# Patient Record
Sex: FEMALE | Race: WHITE | NOT HISPANIC OR LATINO | URBAN - METROPOLITAN AREA
[De-identification: names, ages, dates, MRNs, and addresses within clinical notes are randomized per-mention and may not be internally consistent; named-entity substitution may affect disease eponyms.]

---

## 2021-04-08 DIAGNOSIS — Z23 ENCOUNTER FOR IMMUNIZATION: ICD-10-CM

## 2023-06-09 ENCOUNTER — CONSULT (OUTPATIENT)
Dept: GASTROENTEROLOGY | Facility: CLINIC | Age: 68
End: 2023-06-09
Payer: MEDICARE

## 2023-06-09 ENCOUNTER — TELEPHONE (OUTPATIENT)
Dept: GASTROENTEROLOGY | Facility: CLINIC | Age: 68
End: 2023-06-09

## 2023-06-09 VITALS
WEIGHT: 175 LBS | BODY MASS INDEX: 29.88 KG/M2 | HEIGHT: 64 IN | DIASTOLIC BLOOD PRESSURE: 82 MMHG | SYSTOLIC BLOOD PRESSURE: 130 MMHG

## 2023-06-09 DIAGNOSIS — Z80.0 FAMILY HISTORY OF COLON CANCER: ICD-10-CM

## 2023-06-09 DIAGNOSIS — Z86.010 HISTORY OF COLON POLYPS: ICD-10-CM

## 2023-06-09 DIAGNOSIS — Z79.01 CURRENT USE OF LONG TERM ANTICOAGULATION: ICD-10-CM

## 2023-06-09 DIAGNOSIS — A04.72 C. DIFFICILE DIARRHEA: Primary | ICD-10-CM

## 2023-06-09 PROCEDURE — 99203 OFFICE O/P NEW LOW 30 MIN: CPT | Performed by: NURSE PRACTITIONER

## 2023-06-09 RX ORDER — MAGNESIUM OXIDE 400 MG/1
TABLET ORAL
COMMUNITY
Start: 2023-05-05

## 2023-06-09 RX ORDER — LORAZEPAM 0.5 MG/1
TABLET ORAL
COMMUNITY
Start: 2023-06-07

## 2023-06-09 RX ORDER — OMEPRAZOLE 20 MG/1
CAPSULE, DELAYED RELEASE ORAL
COMMUNITY

## 2023-06-09 RX ORDER — VALSARTAN 320 MG/1
320 TABLET ORAL DAILY
COMMUNITY
Start: 2023-05-05

## 2023-06-09 RX ORDER — DILTIAZEM HYDROCHLORIDE 120 MG/1
CAPSULE, COATED, EXTENDED RELEASE ORAL
COMMUNITY

## 2023-06-09 RX ORDER — ACETAMINOPHEN 500 MG
500 TABLET ORAL
COMMUNITY

## 2023-06-09 RX ORDER — CARVEDILOL 12.5 MG/1
25 TABLET ORAL 2 TIMES DAILY
COMMUNITY

## 2023-06-09 RX ORDER — ATORVASTATIN CALCIUM 10 MG/1
TABLET, FILM COATED ORAL
COMMUNITY

## 2023-06-09 RX ORDER — APIXABAN 5 MG/1
5 TABLET, FILM COATED ORAL 2 TIMES DAILY
COMMUNITY
Start: 2023-05-11

## 2023-06-09 RX ORDER — VANCOMYCIN HYDROCHLORIDE 125 MG/1
CAPSULE ORAL
COMMUNITY
Start: 2023-05-31

## 2023-06-09 RX ORDER — POLYETHYLENE GLYCOL 3350, SODIUM SULFATE ANHYDROUS, SODIUM BICARBONATE, SODIUM CHLORIDE, POTASSIUM CHLORIDE 236; 22.74; 6.74; 5.86; 2.97 G/4L; G/4L; G/4L; G/4L; G/4L
4000 POWDER, FOR SOLUTION ORAL ONCE
Qty: 4000 ML | Refills: 0 | Status: SHIPPED | OUTPATIENT
Start: 2023-06-09 | End: 2023-06-09

## 2023-06-09 NOTE — TELEPHONE ENCOUNTER
Scheduled date of colonoscopy (as of today):8-21-23  Doctor performing colonoscopy:Janet  Location of colonoscopy:[facility]  Bowel prep reviewed with patient:Keegan  Instructions reviewed with patient by:SHIREEN  Clearances: Yes  Eliquis

## 2023-06-09 NOTE — PROGRESS NOTES
[facility] - Outpatient Consultation  Ly Kramer 75 y o  female MRN: [id number]  Encounter: [id number]    ASSESSMENT AND PLAN:      1  C  difficile diarrhea  Patient referred by PCP  She states she developed C  difficile after taking antibiotics for urinary tract infection  After 1 round of vancomycin she continued to have diarrhea and was given a second round of vancomycin 125 mg 4 times a day x10 days  Today she finishes her second round  She states her bowel movements are flat and in pieces     -Encouraged low fiber diet at this time   -Imodium, as needed as directed  -Adequate fluids  -Encourage patient to notify the office or via MyChart if diarrhea should recur in the next few days to a week  2   Current long-term use of anticoagulation meds  Patient is currently taking Eliquis 5 mg twice daily for history of atrial fibrillation  Patient denies chest pain, shortness of breath and or palpitations  Discussed with patient the small possibility of thromboembolic event while holding Eliquis 2 days  Requested for stoppage of medication will be forwarded to Dr Ari Austin  3  History of colon polyps/family history of colon cancer  Colonoscopy 2018  Patient had multiple polyps  Noted 7 to 10-year recall  Patient states both her parents  of colon cancer in their [de-identified]  In patient's current setting, will recall colonoscopy at 5 years  - Colonoscopy scheduled at [facility]  - polyethylene glycol (Golytely) 4000 mL solution; Take 4,000 mL by mouth once for 1 dose Take 4000 mL by mouth once for 1 dose   Use as directed  Dispense: 4000 mL; Refill: 0      Followup Appointment: 3 weeks after procedure  ______________________________________________________________________    Chief Complaint   Patient presents with   • C-Diff     Not getting worse but not getting better       HPI:   Ly Kramer is a 75y o  year old female with past medical history of atrial fibrillation on Eliquis, GERD, hypertension and anemia is referred by PCP for follow-up C  Difficile  Patient states she was diagnosed with C  difficile after taking antibiotics for a urinary tract infection  Patient states after 1 round of vancomycin she continued to have diarrhea and was placed on a second round of vancomycin which she will finish today  Patient states her bowel movements are now very small and comes out flat and sometimes in pieces however she has not had any liquid diarrhea or diarrhea for a few days  On exam, patient denies nausea, vomiting or abdominal pain  Denies hematochezia or melena  Discussed with patient low fiber diet  Also discussed with patient that she is taking magnesium for a slightly low magnesium level  Cutting dosing in half since she is taking the medication until its all  Can be contributing to loose bowel movements  Did have colonoscopy in 2018 and was told 7 to 10 years due to polyps  Patient states both her parents were diagnosed with colon cancer in their [de-identified]  Discussed with patient having her follow-up with her colonoscopy at this time        Historical Information   Past Medical History:   Diagnosis Date   • Anemia 1997   • GERD (gastroesophageal reflux disease) 2011   • Hypertension 1997     Past Surgical History:   Procedure Laterality Date   • COLONOSCOPY  2018   • UPPER GASTROINTESTINAL ENDOSCOPY  2011     Social History     Substance and Sexual Activity   Alcohol Use Never     Social History     Substance and Sexual Activity   Drug Use Never     Social History     Tobacco Use   Smoking Status Never   Smokeless Tobacco Never     Family History   Problem Relation Age of Onset   • Colon cancer Mother    • Hypertension Mother    • Colon cancer Father        Meds/Allergies     Current Outpatient Medications:   •  acetaminophen (TYLENOL) 500 mg tablet  •  atorvastatin (LIPITOR) 10 mg tablet  •  carvedilol (Coreg) 12 5 mg tablet  •  diltiazem (CARDIZEM CD) 120 "mg 24 hr capsule  •  Eliquis 5 MG  •  LORazepam (ATIVAN) 0 5 mg tablet  •  magnesium oxide (MAG-OX) 400 mg tablet  •  omeprazole (PriLOSEC) 20 mg delayed release capsule  •  polyethylene glycol (Golytely) 4000 mL solution  •  valsartan (DIOVAN) 320 MG tablet  •  vancomycin (VANCOCIN) 125 MG capsule    Allergies   Allergen Reactions   • Augmentin [Amoxicillin-Pot Clavulanate] Diarrhea       PHYSICAL EXAM:    Blood pressure 130/82, height 5' 4\" (1 626 m), weight 79 4 kg (175 lb)  Body mass index is 30 04 kg/m²  Normal exam    General Appearance: NAD, cooperative, alert  Eyes: Anicteric, PERRLA, EOMI  ENT:  Normocephalic, atraumatic, normal mucosa  Neck:  Supple, symmetrical, trachea midline,   Resp:  Clear to auscultation bilaterally; no rales, rhonchi or wheezing; respirations unlabored   CV:  S1 S2, Regular rate and rhythm; no murmur, rub, or gallop  GI:  Soft, non-tender, non-distended; normal bowel sounds; no masses, no organomegaly   Rectal: Deferred  Musculoskeletal: No cyanosis, clubbing or edema  Normal ROM  Skin:  No jaundice, rashes, or lesions   Heme/Lymph: No palpable cervical lymphadenopathy  Psych: Normal affect, good eye contact  Neuro: No gross deficits, AAOx3    Lab Results:   Lab Results   Component Value Date    HCT 32 5 (L) 01/04/2015    HGB 10 8 (L) 01/04/2015    MCV 94 01/04/2015     01/04/2015    WBC 8 89 01/04/2015     Lab Results   Component Value Date    ALKPHOS 111 01/04/2015    ALT 52 01/04/2015    ANIONGAP 7 01/05/2015    AST 22 01/04/2015    BILITOT 0 6 01/04/2015    BUN 36 (H) 01/05/2015    CALCIUM 9 9 01/05/2015     01/05/2015    CO2 26 01/05/2015    CREATININE 1 34 (H) 01/05/2015    GLUCOSE 119 01/05/2015    K 4 0 01/05/2015     01/05/2015    PROT 7 2 01/04/2015     No results found for: \"FERRITIN\", \"IRON\", \"TIBC\"  No results found for: \"LIPASE\"    Radiology Results:   No results found        REVIEW OF SYSTEMS:    CONSTITUTIONAL: Denies any fever, chills, " rigors, and weight loss  HEENT: No earache or tinnitus  Denies hearing loss or visual disturbances  CARDIOVASCULAR: No chest pain or palpitations  RESPIRATORY: Denies any cough, hemoptysis, shortness of breath or dyspnea on exertion  GASTROINTESTINAL: As noted in the History of Present Illness  GENITOURINARY: No problems with urination  Denies any hematuria or dysuria  NEUROLOGIC: No dizziness or vertigo, denies headaches  MUSCULOSKELETAL: Denies any muscle or joint pain  SKIN: Denies skin rashes or itching  ENDOCRINE: Denies excessive thirst  Denies intolerance to heat or cold  PSYCHOSOCIAL: Denies depression or anxiety  Denies any recent memory loss

## 2023-09-29 ENCOUNTER — TELEPHONE (OUTPATIENT)
Dept: GASTROENTEROLOGY | Facility: CLINIC | Age: 68
End: 2023-09-29

## 2023-09-29 ENCOUNTER — TELEPHONE (OUTPATIENT)
Age: 68
End: 2023-09-29

## 2023-09-29 NOTE — TELEPHONE ENCOUNTER
Left message for patient to call back an update [facility] questions for 10/19/2023 colonoscopy.
[facility]

## 2023-10-09 ENCOUNTER — TELEPHONE (OUTPATIENT)
Dept: GASTROENTEROLOGY | Facility: CLINIC | Age: 68
End: 2023-10-09

## 2024-01-08 ENCOUNTER — TELEPHONE (OUTPATIENT)
Dept: GASTROENTEROLOGY | Facility: CLINIC | Age: 69
End: 2024-01-08

## 2024-01-16 ENCOUNTER — TELEPHONE (OUTPATIENT)
Dept: GASTROENTEROLOGY | Facility: CLINIC | Age: 69
End: 2024-01-16

## 2024-01-16 NOTE — TELEPHONE ENCOUNTER
Our mutual patient is scheduled for a procedure: 1/31/2024    On colonoscopy    With: Alen    He/She is taking the following blood thinner: Eliquis    Can this be stopped 2 Days prior to the procedure.    Physician approving Clearance: [facility]    Last dose will be:  1/28/2024

## 2024-01-17 ENCOUNTER — ANESTHESIA (OUTPATIENT)
Dept: ANESTHESIOLOGY | Facility: AMBULATORY SURGERY CENTER | Age: 69
End: 2024-01-17

## 2024-01-17 ENCOUNTER — ANESTHESIA EVENT (OUTPATIENT)
Dept: ANESTHESIOLOGY | Facility: AMBULATORY SURGERY CENTER | Age: 69
End: 2024-01-17

## 2024-02-23 ENCOUNTER — TELEPHONE (OUTPATIENT)
Dept: NEUROLOGY | Facility: CLINIC | Age: 69
End: 2024-02-23

## 2024-02-23 NOTE — TELEPHONE ENCOUNTER
Made an appt reminder call no answer lmom. Appt with dr frances on 2/28/24 @8:00am in [facility].

## 2024-03-07 ENCOUNTER — ANESTHESIA EVENT (OUTPATIENT)
Dept: ANESTHESIOLOGY | Facility: AMBULATORY SURGERY CENTER | Age: 69
End: 2024-03-07

## 2024-03-07 ENCOUNTER — ANESTHESIA (OUTPATIENT)
Dept: ANESTHESIOLOGY | Facility: AMBULATORY SURGERY CENTER | Age: 69
End: 2024-03-07

## 2024-05-16 ENCOUNTER — TELEPHONE (OUTPATIENT)
Dept: NEUROLOGY | Facility: CLINIC | Age: 69
End: 2024-05-16

## 2024-07-31 ENCOUNTER — OFFICE VISIT (OUTPATIENT)
Dept: NEUROLOGY | Facility: CLINIC | Age: 69
End: 2024-07-31
Payer: MEDICARE

## 2024-07-31 ENCOUNTER — TELEPHONE (OUTPATIENT)
Dept: NEUROLOGY | Facility: CLINIC | Age: 69
End: 2024-07-31

## 2024-07-31 VITALS
DIASTOLIC BLOOD PRESSURE: 84 MMHG | HEIGHT: 64 IN | TEMPERATURE: 97.9 F | BODY MASS INDEX: 29.37 KG/M2 | HEART RATE: 76 BPM | WEIGHT: 172 LBS | SYSTOLIC BLOOD PRESSURE: 132 MMHG

## 2024-07-31 DIAGNOSIS — G60.3 IDIOPATHIC PROGRESSIVE NEUROPATHY: ICD-10-CM

## 2024-07-31 DIAGNOSIS — G62.9 POLYNEUROPATHY: Primary | ICD-10-CM

## 2024-07-31 DIAGNOSIS — G60.9 HEREDITARY AND IDIOPATHIC NEUROPATHY, UNSPECIFIED: ICD-10-CM

## 2024-07-31 PROCEDURE — 99204 OFFICE O/P NEW MOD 45 MIN: CPT | Performed by: PSYCHIATRY & NEUROLOGY

## 2024-07-31 RX ORDER — GABAPENTIN 100 MG/1
100 CAPSULE ORAL
COMMUNITY
Start: 2024-07-02

## 2024-07-31 NOTE — TELEPHONE ENCOUNTER
Called pt's PCP office - [facility] [phone]. Unable to speak w/live agent. Left detailed msg re: note below.   Requested return call acknowledging receipt of vm.

## 2024-07-31 NOTE — ASSESSMENT & PLAN NOTE
Patient here today for initial consultation for lower extremity paresthesias.  Patient was seen in the emergency room x 3 at [facility].  No reports sent for review.  No access at this juncture for reports.  Making a request at this time.  Patient also had MRI of the cervical spine as well as EMG of the lower extremities done over the past month.  These were ordered by her orthopedist that she follows for her neck.  Reports unavailable.  However patient was able to get into her portal and I reviewed on her iPad.  Patient had EMG done on June 24, 2024.  Only interpretation was available.  No raw data or overall graphics were on report.  EMG assessment was idiopathic polyneuropathy in body of report, there is mention of absent bilateral sural and decreased amplitude and conduction velocity of fibular motor and tibial motor conductions.  Patient also had an MRI of the cervical spine performed on June 27, 2024.  Again this was reviewed on iPad.  Patient with multilevel degenerative disc disease most notable at C4-C5, less notable C5-C6 and C6-C7.  Patient also has a right thyroid nodule.  Patient was unaware of the thyroid nodule.  Per report it also recommended an ultrasound follow-up.  Reminded patient that it is important for her to follow-up with her orthopedist to order the study as well as her PCP to ensure that she gets the proper follow-up for this abnormality.  Patient will call both of these providers today.  On exam, patient with decreased vibratory sensation bilateral lower extremities.  Patient also with decreased proprioception bilateral lower extremities.  Patient with decreased temperature in the lower half of the calves bilaterally.  Patient's symptoms, EMG, and exam fit most with a peripheral neuropathy.  There are no myelopathic findings on examination today.  MRI cervical spine also reveals normal cord in the cervical spine.  Examination is otherwise unremarkable.  Reviewed with patient the  typical pathophysiology of neuropathy.  Reviewed clinical presentation.  Patient is already on gabapentin 100 mg at bedtime.  Patient had some blood work done on April 6 with normal B12 at 790, normal folate.  Patient also recommended to follow-up with PCP due to slightly low GFR of 58 on those labs.  Additional neuropathy labs were ordered for today.  Patient may need an updated EMG of the lower extremities in the future.  Patient has no symptoms in the upper extremities.  No history of diabetes or history of alcohol.  Patient to follow-up after laboratory studies.  Of note, patient is on Eliquis for history of A-fib.

## 2024-07-31 NOTE — TELEPHONE ENCOUNTER
Please call pcp office to alert them to my note from today. Please tell them to specifically let pcp know that pt had an mri c spine done by ortho with evidence of right sided thyroid nodule that warrrants additional testing ie ultrasound. Pt was unaware at her new pt appt today and this needs to be further addressed.  I could only view report from pt's ipad.  She was self referral without any prior workup sent to us.

## 2024-07-31 NOTE — PROGRESS NOTES
Patient ID: Raina Velez is a 68 y.o. female.    Assessment/Plan:    Polyneuropathy  Patient here today for initial consultation for lower extremity paresthesias.  Patient was seen in the emergency room x 3 at [facility].  No reports sent for review.  No access at this juncture for reports.  Making a request at this time.  Patient also had MRI of the cervical spine as well as EMG of the lower extremities done over the past month.  These were ordered by her orthopedist that she follows for her neck.  Reports unavailable.  However patient was able to get into her portal and I reviewed on her iPad.  Patient had EMG done on June 24, 2024.  Only interpretation was available.  No raw data or overall graphics were on report.  EMG assessment was idiopathic polyneuropathy in body of report, there is mention of absent bilateral sural and decreased amplitude and conduction velocity of fibular motor and tibial motor conductions.  Patient also had an MRI of the cervical spine performed on June 27, 2024.  Again this was reviewed on iPad.  Patient with multilevel degenerative disc disease most notable at C4-C5, less notable C5-C6 and C6-C7.  Patient also has a right thyroid nodule.  Patient was unaware of the thyroid nodule.  Per report it also recommended an ultrasound follow-up.  Reminded patient that it is important for her to follow-up with her orthopedist to order the study as well as her PCP to ensure that she gets the proper follow-up for this abnormality.  Patient will call both of these providers today.  On exam, patient with decreased vibratory sensation bilateral lower extremities.  Patient also with decreased proprioception bilateral lower extremities.  Patient with decreased temperature in the lower half of the calves bilaterally.  Patient's symptoms, EMG, and exam fit most with a peripheral neuropathy.  There are no myelopathic findings on examination today.  MRI cervical spine also reveals normal cord in  the cervical spine.  Examination is otherwise unremarkable.  Reviewed with patient the typical pathophysiology of neuropathy.  Reviewed clinical presentation.  Patient is already on gabapentin 100 mg at bedtime.  Patient had some blood work done on April 6 with normal B12 at 790, normal folate.  Patient also recommended to follow-up with PCP due to slightly low GFR of 58 on those labs.  Additional neuropathy labs were ordered for today.  Patient may need an updated EMG of the lower extremities in the future.  Patient has no symptoms in the upper extremities.  No history of diabetes or history of alcohol.  Patient to follow-up after laboratory studies.  Of note, patient is on Eliquis for history of A-fib.       Diagnoses and all orders for this visit:    Polyneuropathy  -     Vitamin B6; Future  -     Vitamin B1 (Thiamine), Serum/Plasma, LC/MS/MS; Future  -     Folate; Future  -     Lyme Total AB W Reflex to IGM/IGG; Future  -     Sjogren's Antibodies; Future  -     Protein electrophoresis, serum; Future  -     Vitamin B6  -     Folate  -     Lyme Total AB W Reflex to IGM/IGG  -     Sjogren's Antibodies  -     Protein electrophoresis, serum    Hereditary and idiopathic neuropathy, unspecified  -     Vitamin B6; Future  -     Vitamin B6    Idiopathic progressive neuropathy  -     Folate; Future  -     Folate    Other orders  -     gabapentin (NEURONTIN) 100 mg capsule; Take 100 mg by mouth daily at bedtime           Subjective:    HPI    Patient here today for initial neuro consultation.  Patient is a 68-year-old female with history of anemia GERD hypertension and hypercholesterolemia and A-fib on Eliquis.  Patient presents today for evaluation of bilateral paresthesias of the lower extremities.  Patient has noted symptoms since November 2023.  [facility] and discharged.  Unfortunately I do not have copy of any of the ER evaluations.  Patient has followed up  with her PCP.  Patient is also been seen by her orthopedist.  Patient has seen the orthopedist due to history of neck pain.  Patient is following with Dr. Maria De Jesus Colby.  Patient is currently a self-referral.  No prior information has been sent for our review.  Staff is trying to obtain as we are at the appointment today.  All history provided is per patient.  Patient reports that her orthopedist ordered MRI of the cervical spine.  This was done on June 27, 2024.  The report was reviewed via patient's iPad in the office today through her portal.  Again specifically asking for any studies or consultation notes to be sent to our office from the PCP and orthopedist.  Per the MRI, patient with multilevel degenerative disc disease at C4-C5.  Last degree at C5-C6 and C6-C7.  Patient also with a right thyroid nodule that was also seen.  Per the report, patient is to follow-up with an ultrasound.  Patient was unaware of this finding.  Patient did bring it to the attention of the ordering physician.  Patient is unclear of further follow-up.  Patient strongly recommended at visit today to touch base with ordering provider as well as her PCP for follow-up imaging based on this abnormal finding.  Patient also had an EMG done of the lower extremities.  Again only the report was available via patient's iPad.  No role data or graphics from the study was seen.  Per assessment, patient with idiopathic peripheral neuropathy.  Per notes technically limited due to lower extremity edema.  Per body of the report, there does appear to be absent sural potentials, and abnormal amplitudes and conduction velocity on fibular motor and tibial motor studies.  Again specific numbers and waveforms not available for review.  Again asked staff to try to obtain full data.  Patient denies any loss of control of bowel or bladder.  No change in vision.  No change in speech or swallowing.  No symptoms in the upper extremities.  Patient had some blood work  "performed on April 6, 2024.  Vitamin B12 790, folate greater than 20, GFR slightly low at 58,.  Patient likely by examination and EMG with evidence of an underlying polyneuropathy.  Reviewed in depth with patient the pathophysiology of polyneuropathy.  Also recommended looking for any medical causes of condition.  Additional laboratory blood work was also ordered for the patient.  Patient is already on gabapentin for her symptoms.  Again reminded patient that this was a med just for symptoms.  Patient denies any weakness of the feet or legs.  No dropped foot bilaterally.  No intractable pain or nocturnal symptoms.  Currently no acute radicular symptoms in either the upper or lower extremities.  No acute myelopathic findings on exam.  Patient to continue with low-dose gabapentin as directed by her PCP.  Patient is already followed for renal status.    The following portions of the patient's history were reviewed and updated as appropriate: allergies, current medications, past family history, past medical history, past social history, past surgical history, and problem list and med rec and ros rev.         Objective:    Blood pressure 132/84, pulse 76, temperature 97.9 °F (36.6 °C), height 5' 4\" (1.626 m), weight 78 kg (172 lb).    Physical Exam  Constitutional:       General: She is not in acute distress.     Appearance: She is not ill-appearing.   Eyes:      General: Lids are normal.      Extraocular Movements: EOM normal     Pupils: Pupils are equal, round, and reactive to light.   Musculoskeletal:      Right lower leg: No edema.   Neurological:      Mental Status: She is alert.      Motor: Motor strength is normal.     Deep Tendon Reflexes:      Reflex Scores:       Tricep reflexes are 2+ on the right side and 2+ on the left side.       Bicep reflexes are 2+ on the right side and 2+ on the left side.       Brachioradialis reflexes are 2+ on the right side and 2+ on the left side.       Patellar reflexes are 1+ on " the right side and 1+ on the left side.       Achilles reflexes are 1+ on the right side and 1+ on the left side.  Psychiatric:         Speech: Speech normal.         Neurological Exam  Mental Status  Alert. Speech is normal. Language is fluent with no aphasia. Attention and concentration are normal. Fund of knowledge is appropriate for level of education.    Cranial Nerves  CN II: Visual acuity is normal. Visual fields full to confrontation.  CN III, IV, VI: Abnormal extraocular movements: Bilateral esotropia right worse than left, alternating. Normal lids and orbits bilaterally. Pupils equal round and reactive to light bilaterally.  CN V: Facial sensation is normal.  CN VII: Full and symmetric facial movement.  CN VIII: Hearing is normal.  CN IX, X: Palate elevates symmetrically. Normal gag reflex.  CN XI: Shoulder shrug strength is normal.  CN XII: Tongue midline without atrophy or fasciculations.    Motor  Normal muscle bulk throughout. Normal muscle tone. No abnormal involuntary movements. Strength is 5/5 throughout all four extremities.    Sensory  Dec vib juan lowers  Dec temp lower half calves juan  Dec prop juan lowers.    Reflexes                                            Right                      Left  Brachioradialis                    2+                         2+  Biceps                                 2+                         2+  Triceps                                2+                         2+  Finger flex                           2+                         2+  Hamstring                            2+                         2+  Patellar                                1+                         1+  Achilles                                1+                         1+  Right Plantar: downgoing  Left Plantar: downgoing    Coordination  Right: Finger-to-nose normal. Rapid alternating movement normal.Left: Finger-to-nose normal. Rapid alternating movement normal.    Gait  Casual gait is normal  including stance, stride, and arm swing.        ROS:    Review of Systems   Constitutional:  Positive for fatigue. Negative for appetite change and fever.   HENT: Negative.  Negative for hearing loss, tinnitus, trouble swallowing and voice change.    Eyes: Negative.  Negative for photophobia, pain and visual disturbance.   Respiratory: Negative.  Negative for shortness of breath.    Cardiovascular: Negative.  Negative for palpitations.   Gastrointestinal: Negative.  Negative for nausea and vomiting.   Endocrine: Negative.  Negative for cold intolerance.   Genitourinary: Negative.  Negative for dysuria, frequency and urgency.   Musculoskeletal:  Positive for gait problem. Negative for back pain, myalgias, neck pain and neck stiffness.        May 15, [facility], Legs & weakness did tests and sent her home. Aches in her legs, burning in both feet. Happens mostly in the morning, no sleep issues   Skin: Negative.  Negative for rash.   Allergic/Immunologic: Negative.    Neurological:  Positive for weakness and numbness. Negative for dizziness, tremors, seizures, syncope, facial asymmetry, speech difficulty, light-headedness and headaches.        Numb/Ting in legs for 6-8 mos   Hematological:  Bruises/bleeds easily.   Psychiatric/Behavioral: Negative.  Negative for confusion, hallucinations and sleep disturbance.    All other systems reviewed and are negative.

## 2024-08-04 LAB
ALBUMIN SERPL ELPH-MCNC: 3.8 G/DL (ref 2.9–4.4)
ALBUMIN/GLOB SERPL: 1.3 {RATIO} (ref 0.7–1.7)
ALPHA1 GLOB SERPL ELPH-MCNC: 0.2 G/DL (ref 0–0.4)
ALPHA2 GLOB SERPL ELPH-MCNC: 0.8 G/DL (ref 0.4–1)
B-GLOBULIN SERPL ELPH-MCNC: 1 G/DL (ref 0.7–1.3)
ENA SS-A AB SER-ACNC: <0.2 AI (ref 0–0.9)
ENA SS-B AB SER-ACNC: <0.2 AI (ref 0–0.9)
FOLATE SERPL-MCNC: >20 NG/ML
GAMMA GLOB SERPL ELPH-MCNC: 0.9 G/DL (ref 0.4–1.8)
GLOBULIN SER CALC-MCNC: 2.9 G/DL (ref 2.2–3.9)
LABORATORY COMMENT REPORT: NORMAL
M PROTEIN SERPL ELPH-MCNC: NORMAL G/DL
PROT SERPL-MCNC: 6.7 G/DL (ref 6–8.5)
VIT B6 SERPL-MCNC: 43.4 UG/L (ref 3.4–65.2)

## 2024-08-06 NOTE — TELEPHONE ENCOUNTER
Called pt's PCP office - [facility] [phone]. Unable to speak w/live agent. Left detailed msg re: note below (ext. 107).     Requested return call acknowledging receipt of vm.

## 2024-08-08 NOTE — TELEPHONE ENCOUNTER
Called pt's PCP office - [facility] [phone]. Spoke w/Arely. PCP office did speak w/pt and a thyroid US was ordered on 7/31/24 after receiving our vm.

## 2024-11-07 ENCOUNTER — TELEPHONE (OUTPATIENT)
Dept: NEUROLOGY | Facility: CLINIC | Age: 69
End: 2024-11-07

## 2024-11-07 ENCOUNTER — PATIENT MESSAGE (OUTPATIENT)
Dept: NEUROLOGY | Facility: CLINIC | Age: 69
End: 2024-11-07

## 2024-11-07 NOTE — PATIENT COMMUNICATION
LOV 7/31/24 Polyneuropathy    --Pt on Gabapentin 100 mg HS (prescribed by her PCP).  --Pt had EMG LE and MRI C-Spine on 6/24/24    NOV 1/30/25    Dr. Engel: please advise

## 2024-11-07 NOTE — TELEPHONE ENCOUNTER
Pt seen once.  Looking for full emg report from [place].  Already reviewed impression.  Carmen not needed again prior to next visit. Please obtain full report.  As discussed at appt, need to make sure no underlying renal issues before further increase in gabapentin.  Told pt to follow up with pcp.  Pt to check with pcp if ok to go to 200mg hs.  Carmen would be helpful for sxs at night.

## 2024-11-18 ENCOUNTER — TELEPHONE (OUTPATIENT)
Dept: NEUROLOGY | Facility: CLINIC | Age: 69
End: 2024-11-18

## 2024-11-18 NOTE — TELEPHONE ENCOUNTER
"Let pt know we received an incomplete report from emg.  On impression it says \"see office notes\".  For recommendations says \"see office notes\".  Need notes from Dr Maria De Jesus Kumar.  Looks like done at [facility] orthopedics at [facility].   "

## 2025-01-13 ENCOUNTER — TELEPHONE (OUTPATIENT)
Dept: NEUROLOGY | Facility: CLINIC | Age: 70
End: 2025-01-13

## 2025-01-13 NOTE — TELEPHONE ENCOUNTER
DOMINIC to confirm your upcoming appt, 1/30/25 @9:30am (9:15am) at the [facility] office, to confirm/RS if you are unable to keep this appt please call [phone]

## 2025-03-20 ENCOUNTER — OFFICE VISIT (OUTPATIENT)
Dept: NEUROLOGY | Facility: CLINIC | Age: 70
End: 2025-03-20
Payer: MEDICARE

## 2025-03-20 VITALS
SYSTOLIC BLOOD PRESSURE: 140 MMHG | TEMPERATURE: 97.7 F | HEIGHT: 64 IN | BODY MASS INDEX: 28 KG/M2 | WEIGHT: 164 LBS | DIASTOLIC BLOOD PRESSURE: 80 MMHG | HEART RATE: 105 BPM

## 2025-03-20 DIAGNOSIS — G62.9 POLYNEUROPATHY: Primary | ICD-10-CM

## 2025-03-20 DIAGNOSIS — G60.3 IDIOPATHIC PROGRESSIVE NEUROPATHY: ICD-10-CM

## 2025-03-20 PROCEDURE — 99214 OFFICE O/P EST MOD 30 MIN: CPT | Performed by: PSYCHIATRY & NEUROLOGY

## 2025-03-20 RX ORDER — DILTIAZEM HYDROCHLORIDE 360 MG/1
1 CAPSULE, EXTENDED RELEASE ORAL DAILY
COMMUNITY
Start: 2025-01-29

## 2025-03-20 RX ORDER — METOPROLOL SUCCINATE 100 MG/1
1 TABLET, EXTENDED RELEASE ORAL 2 TIMES DAILY
COMMUNITY
Start: 2025-01-29

## 2025-03-20 NOTE — ASSESSMENT & PLAN NOTE
Patient here today for neuro follow-up  Patient initially seen for neuropathy in July.  Was able to obtain some raw data from EMG from [facility], patient with abnormalities in the bilateral fibular motor nerve potentials decreased amplitude decreased conduction, also bilateral tibial motor potentials decreased amplitude decreased conduction velocity, also no response seen from sensory bilateral sural responses.  Clinical and electrophysiological findings consistent with polyneuropathy.  July 31, 2024 labs, SPEP negative B6 43, Sjogren's negative  Vitamin B1 and cryoglobulins not performed despite being ordered at same time as other labs.  Will update these labs and again repeat vitamin B12.  Last vitamin B12 was done in April 2024.  Patient may also benefit from checking a hemoglobin A1c.  Patient reminded that she does have a lower GFR from February labs at 44 compared to November 2024 with a GFR of 53.  Gabapentin dosing per PCP to follow renal statusOverall exam stable.  Normal foot intrinsics.  Reviewed fall safety precautions especially since patient is on Eliquis for her known A-fib.  Patient to get her Lyme, vitamin B1, cryoglobulins performed as written.  Patient to get hemoglobin A1c ordered as part of her medical eval from PCP.  Patient following up with cardiology since she had [place] admission for CHF in end of January.  Overall patient stable.  Patient to follow-up with podiatry  Patient to call for any new symptoms.  Return in 6 months.

## 2025-03-20 NOTE — PROGRESS NOTES
"Name: Raina Velez      : 1955      MRN: [id number]  Encounter Provider: Cherry Engel MD  Encounter Date: 3/20/2025   Encounter department: [facility] [place]  :  Assessment & Plan  Polyneuropathy  Patient here today for neuro follow-up  Patient initially seen for neuropathy in July.  Was able to obtain some raw data from EMG from [facility], patient with abnormalities in the bilateral fibular motor nerve potentials decreased amplitude decreased conduction, also bilateral tibial motor potentials decreased amplitude decreased conduction velocity, also no response seen from sensory bilateral sural responses.  Clinical and electrophysiological findings consistent with polyneuropathy.  2024 labs, SPEP negative B6 43, Sjogren's negative  Vitamin B1 and cryoglobulins not performed despite being ordered at same time as other labs.  Will update these labs and again repeat vitamin B12.  Last vitamin B12 was done in 2024.  Patient may also benefit from checking a hemoglobin A1c.  Patient reminded that she does have a lower GFR from February labs at 44 compared to 2024 with a GFR of 53.  Gabapentin dosing per PCP to follow renal statusOverall exam stable.  Normal foot intrinsics.  Reviewed fall safety precautions especially since patient is on Eliquis for her known A-fib.  Patient to get her Lyme, vitamin B1, cryoglobulins performed as written.  Patient to get hemoglobin A1c ordered as part of her medical eval from PCP.  Patient following up with cardiology since she had [place] admission for CHF in end of January.  Overall patient stable.  Patient to follow-up with podiatry  Patient to call for any new symptoms.  Return in 6 months.               History of Present Illness   HPI   Patient here today for follow up neuro consultation. Pt initially seen on 24.  Per my last note \" Patient is a 68-year-old female with history of anemia GERD hypertension and " hypercholesterolemia and A-fib on Eliquis.  Patient presents today for evaluation of bilateral paresthesias of the lower extremities.  Patient has noted symptoms since November 2023.  Patient has been seen in the emergency room at [place] for 3 visits and discharged.  Unfortunately I do not have copy of any of the ER evaluations.  Patient has followed up with her PCP.  Patient is also been seen by her orthopedist.  Patient has seen the orthopedist due to history of neck pain.  Patient is following with Dr. Maria De Jesus Colby.  Patient is currently a self-referral.  No prior information has been sent for our review.  Staff is trying to obtain as we are at the appointment today.  All history provided is per patient.  Patient reports that her orthopedist ordered MRI of the cervical spine.  This was done on June 27, 2024.  The report was reviewed via patient's iPad in the office today through her portal.  Again specifically asking for any studies or consultation notes to be sent to our office from the PCP and orthopedist.  Per the MRI, patient with multilevel degenerative disc disease at C4-C5.  Last degree at C5-C6 and C6-C7.  Patient also with a right thyroid nodule that was also seen.  Per the report, patient is to follow-up with an ultrasound.  Patient was unaware of this finding.  Patient did bring it to the attention of the ordering physician.  Patient is unclear of further follow-up.  Patient strongly recommended at visit today to touch base with ordering provider as well as her PCP for follow-up imaging based on this abnormal finding.  Patient also had an EMG done of the lower extremities.  Again only the report was available via patient's iPad.  No role data or graphics from the study was seen.  Per assessment, patient with idiopathic peripheral neuropathy.  Per notes technically limited due to lower extremity edema.  Per body of the report, there does appear to be absent sural potentials, and abnormal amplitudes and  "conduction velocity on fibular motor and tibial motor studies.  Again specific numbers and waveforms not available for review.  Again asked staff to try to obtain full data.  Patient denies any loss of control of bowel or bladder.  No change in vision.  No change in speech or swallowing.  No symptoms in the upper extremities.  Patient had some blood work performed on April 6, 2024.  Vitamin B12 790, folate greater than 20, GFR slightly low at 58,.  Patient likely by examination and EMG with evidence of an underlying polyneuropathy.  Reviewed in depth with patient the pathophysiology of polyneuropathy.  Also recommended looking for any medical causes of condition.  Additional laboratory blood work was also ordered for the patient.  Patient is already on gabapentin for her symptoms.  Again reminded patient that this was a med just for symptoms.  Patient denies any weakness of the feet or legs.  No dropped foot bilaterally.  No intractable pain or nocturnal symptoms.  Currently no acute radicular symptoms in either the upper or lower extremities.  No acute myelopathic findings on exam.  Patient to continue with low-dose gabapentin as directed by her PCP.  Patient is already followed for renal status.\"  Patient here today for neuro follow-up.  Patient last seen as initial visit July 31, 2024.  Patient followed for her polyneuropathy.Of note, patient on Eliquis for atrial fibrillation.Some raw data from EMG from [facility] from June 2020 for bilateral fibular motor potentials decreased amplitude and decreased conduction velocity, bilateral tibial motor decreased amplitude and decreased conduction velocity, bilateral sural sensory potentials no response.Patient's clinical and electrophysiological findings most consistent with a polyneuropathy.  Patient had some updated labs since her last visit.  Patient had labs drawn on 7/31/2024 SPEP no M spike, vitamin B6 43, Sjogren's negative, folate greater than 20, Lyme B1 and " cryoglobulins were not performed by laboratory.  Patient's last vitamin B12 was done in April 2024 with a level of 790 at that time.  Of note, patient had some additional labs performed for other provider February 20, 2025 GFR low at 44 compared to value in November 2024 with a GFR of 53.  Patient is prescribed her gabapentin by her PCP who can also follow her renal clearance.  Patient notes no falls or trips since last visit.  No change in bowel or bladder.  No change in speech or swallowing.  No involvement of the upper extremities.  No change in vision.  Patient did have blood work performed as noted above.  Strongly recommend updated hemoglobin A1c.  Patient will discuss with PCP as well.  Patient following up with cardiology since her hospitalization for congestive heart failure back in the end of January.  Patient has upcoming cardiology appointment as well.  Patient follows with podiatry.  Patient reminded of caution on heights, ladders or inclines.  Only using cane since she came out of the hospital for her congestive heart failure as needed especially with increased activities or endurance.  The following portions of the patient's history were reviewed and updated as appropriate: allergies, current medications, past family history, past medical history, past social history, past surgical history, and problem list and med rec and ros rev.   Total time spent today 35 minutes. Greater than 50% of total time was spent with the patient and / or family counseling and / or coordination of care     Review of Systems   Constitutional:  Negative for appetite change, fatigue and fever.   HENT: Negative.  Negative for hearing loss, tinnitus, trouble swallowing and voice change.    Eyes: Negative.  Negative for photophobia, pain and visual disturbance.   Respiratory: Negative.  Negative for shortness of breath.    Cardiovascular: Negative.  Negative for palpitations.        Patient hospitalized for CHF Jan 27-29 [facility]  "  Gastrointestinal: Negative.  Negative for nausea and vomiting.   Endocrine: Negative.  Negative for cold intolerance.   Genitourinary: Negative.  Negative for dysuria, frequency and urgency.   Musculoskeletal:  Negative for back pain, gait problem, myalgias, neck pain and neck stiffness.   Skin: Negative.  Negative for rash.   Allergic/Immunologic: Negative.    Neurological:  Negative for dizziness, tremors, seizures, syncope, facial asymmetry, speech difficulty, weakness, light-headedness, numbness and headaches.   Hematological: Negative.  Does not bruise/bleed easily.   Psychiatric/Behavioral: Negative.  Negative for confusion, hallucinations and sleep disturbance.    All other systems reviewed and are negative.   I have personally reviewed the MA's review of systems and made changes as necessary.  Patient says Neuropathy is status quo       Objective   /80   Pulse 105   Temp 97.7 °F (36.5 °C)   Ht 5' 4\" (1.626 m)   Wt 74.4 kg (164 lb)   BMI 28.15 kg/m²     Physical Exam  Constitutional:       General: She is not in acute distress.     Appearance: She is not ill-appearing.   Eyes:      General: Lids are normal.      Extraocular Movements: Extraocular movements intact.      Pupils: Pupils are equal, round, and reactive to light.   Musculoskeletal:      Right lower leg: No edema.      Left lower leg: No edema.   Neurological:      Mental Status: She is alert.      Motor: Motor strength is normal.     Deep Tendon Reflexes:      Reflex Scores:       Tricep reflexes are 2+ on the right side and 2+ on the left side.       Bicep reflexes are 2+ on the right side and 2+ on the left side.       Brachioradialis reflexes are 2+ on the right side and 2+ on the left side.       Patellar reflexes are 1+ on the right side and 1+ on the left side.       Achilles reflexes are 1+ on the right side and 1+ on the left side.  Psychiatric:         Speech: Speech normal.       Neurological Exam  Mental Status  Alert. " Recent and remote memory are intact. Speech is normal. Language is fluent with no aphasia. Attention and concentration are normal. Fund of knowledge is appropriate for level of education.    Cranial Nerves  CN II: Visual acuity is normal. Visual fields full to confrontation.  CN III, IV, VI: Extraocular movements intact bilaterally. Normal lids and orbits bilaterally. Pupils equal round and reactive to light bilaterally.  CN V: Facial sensation is normal.  CN VII: Full and symmetric facial movement.  CN VIII: Hearing is normal.  CN IX, X: Palate elevates symmetrically. Normal gag reflex.  CN XI: Shoulder shrug strength is normal.  CN XII: Tongue midline without atrophy or fasciculations.    Motor  Normal muscle bulk throughout. Normal muscle tone. No abnormal involuntary movements. Strength is 5/5 throughout all four extremities.    Sensory  Dec vib juan lowers.    Reflexes                                            Right                      Left  Brachioradialis                    2+                         2+  Biceps                                 2+                         2+  Triceps                                2+                         2+  Finger flex                           2+                         2+  Hamstring                            2+                         2+  Patellar                                1+                         1+  Achilles                                1+                         1+  Right Plantar: downgoing  Left Plantar: downgoing    Coordination  Right: Finger-to-nose normal. Rapid alternating movement normal.Left: Finger-to-nose normal. Rapid alternating movement normal.    Gait  Casual gait: Wide stance.

## 2025-04-28 LAB — B BURGDOR IGG+IGM SER QL IA: NEGATIVE

## 2025-05-01 LAB
CRYOGLOB SER QL 1D COLD INC: NORMAL
VIT B12 SERPL-MCNC: 607 PG/ML (ref 232–1245)